# Patient Record
(demographics unavailable — no encounter records)

---

## 2019-03-01 NOTE — NUR
C/O COUGH WITH CHEST DISCOMFORT AND BACK PAIN X 3 DAYS. 7/10 PAIN, COMES AND 
GOES, DULL. PT ALSO REPORTS HEADACHE AND RHINORRHEA.

PT DESCRIBES COUGH AS INTERMITTENT PRODUCTIVE AND YELLOW. VSS, [id number]

BED IS DOWN, LOCKED, BED RAIL X 1, ERMD NOTIFIED OF PATIENT STATUS





RX: OMEPRAZOLE, HTN MEDICATION, ASPIRIN, BLOOD THINNER

HX: HTN

## 2019-03-01 NOTE — NUR
PT SITTING IN AN UPRIGHT POSITION ON [facility] AT THIS TIME W/ VSS. NO 
NEW COMPLAINTS. SAFETY PRECAUTIONS IN PLACE. WILL CONTINUE TO MONITOR.

## 2019-03-01 NOTE — NUR
PT STATES THAT SHE IS LEAVING AT THIS TIME BECAUSE OF THE WAIT TO SEE THE DR. 
ATTEMPTED TO GET PT TO STAY. PT REFUSES. VINNIE REECE NOTIFIED.

## 2020-06-16 NOTE — NUR
6 Y/O FEMALE C/O PT STUBBED TOE AT HOME ON L FOOT. EDEMA AND DISCOLORATION 
NOTED. PT STATES 9/10 PAIN. PAIN IS SHARP, THROBBING. PT CMS INTACT. DENIES 
N/V/D; SKIN IS PINK/WARM/DRY; AAOX4 WITH EVEN AND STEADY GAIT;  PT DENIES ANY 
FEVER, CP, SOB, OR COUGH AT THIS TIME; VSS; PATIENT POSITIONED FOR COMFORT; HOB 
ELEVATED; BEDRAILS UP X2; BED DOWN AND LOCKED. 



PMH:HTN

NKA